# Patient Record
Sex: MALE | Race: WHITE | Employment: STUDENT | URBAN - METROPOLITAN AREA
[De-identification: names, ages, dates, MRNs, and addresses within clinical notes are randomized per-mention and may not be internally consistent; named-entity substitution may affect disease eponyms.]

---
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
## 2019-09-13 ENCOUNTER — HOSPITAL ENCOUNTER (OUTPATIENT)
Age: 19
Discharge: HOME | End: 2019-09-13
Payer: COMMERCIAL

## 2019-09-13 VITALS
TEMPERATURE: 98 F | HEART RATE: 61 BPM | SYSTOLIC BLOOD PRESSURE: 106 MMHG | DIASTOLIC BLOOD PRESSURE: 69 MMHG | OXYGEN SATURATION: 98 % | RESPIRATION RATE: 16 BRPM

## 2019-09-13 DIAGNOSIS — H61.22 IMPACTED CERUMEN OF LEFT EAR: Primary | ICD-10-CM

## 2019-09-13 PROCEDURE — 69209 REMOVE IMPACTED EAR WAX UNI: CPT

## 2019-09-13 PROCEDURE — 99212 OFFICE O/P EST SF 10 MIN: CPT

## 2019-09-13 NOTE — ED PROVIDER NOTES
Patient presents with:  Ear Problem Pain (neurosensory)      HPI:     Jean-Pierre Sanchez is a 20year old male who presents with a chief complaint of inability to hear out of the right ear for the past couple days. No pain. No history of trauma.   No heada Concerns:        Not on file    Social History Narrative      Not on file        ROS:   Positive for stated complaint: hearing loss right ear  All other systems reviewed and negative except as noted above. Constitutional and Vital Signs Reviewed.       Sharyn soon as possible for a visit in 2 days

## 2021-06-06 ENCOUNTER — HOSPITAL ENCOUNTER (OUTPATIENT)
Age: 21
Discharge: HOME | End: 2021-06-06
Attending: EMERGENCY MEDICINE
Payer: COMMERCIAL

## 2021-06-06 VITALS
HEART RATE: 87 BPM | OXYGEN SATURATION: 98 % | TEMPERATURE: 97 F | RESPIRATION RATE: 16 BRPM | DIASTOLIC BLOOD PRESSURE: 79 MMHG | SYSTOLIC BLOOD PRESSURE: 143 MMHG

## 2021-06-06 DIAGNOSIS — H61.21 IMPACTED CERUMEN OF RIGHT EAR: Primary | ICD-10-CM

## 2021-06-06 PROCEDURE — 99212 OFFICE O/P EST SF 10 MIN: CPT

## 2021-06-06 PROCEDURE — 69209 REMOVE IMPACTED EAR WAX UNI: CPT

## 2021-06-06 NOTE — ED INITIAL ASSESSMENT (HPI)
Patient reports muffled hearing from his right ear starting this am.  [place].

## 2021-06-06 NOTE — ED PROVIDER NOTES
Patient Seen in: [facility]      History   Patient presents with:  Ear Problem Pain    Stated Complaint: ear pain    HPI/Subjective:   HPI    The patient is a 25-year-old male with no significant past medical history who presents now with \"b Pulse ox is 98% on room air, normal.  Vital signs are stable    Procedure note: Ear irrigation  After irrigation by the medical assistant, the external auditory canals are now clear of wax.   The TMs are both normal.         MDM      Cerumen impaction gabo

## 2021-09-26 ENCOUNTER — APPOINTMENT (OUTPATIENT)
Dept: CT IMAGING | Age: 21
End: 2021-09-26
Attending: PHYSICIAN ASSISTANT
Payer: COMMERCIAL

## 2021-09-26 ENCOUNTER — HOSPITAL ENCOUNTER (OUTPATIENT)
Age: 21
Discharge: HOME | End: 2021-09-26
Attending: PHYSICIAN ASSISTANT
Payer: COMMERCIAL

## 2021-09-26 VITALS
SYSTOLIC BLOOD PRESSURE: 127 MMHG | TEMPERATURE: 98 F | RESPIRATION RATE: 18 BRPM | OXYGEN SATURATION: 96 % | HEART RATE: 93 BPM | DIASTOLIC BLOOD PRESSURE: 72 MMHG

## 2021-09-26 DIAGNOSIS — T07.XXXA MULTIPLE ABRASIONS: ICD-10-CM

## 2021-09-26 DIAGNOSIS — S02.2XXA CLOSED FRACTURE OF NASAL BONE, INITIAL ENCOUNTER: Primary | ICD-10-CM

## 2021-09-26 DIAGNOSIS — S09.90XA CLOSED HEAD INJURY WITHOUT LOSS OF CONSCIOUSNESS, INITIAL ENCOUNTER: ICD-10-CM

## 2021-09-26 PROCEDURE — 70486 CT MAXILLOFACIAL W/O DYE: CPT | Performed by: PHYSICIAN ASSISTANT

## 2021-09-26 PROCEDURE — 70450 CT HEAD/BRAIN W/O DYE: CPT | Performed by: PHYSICIAN ASSISTANT

## 2021-09-26 PROCEDURE — 21310 HC CLOSED TX NASAL BONE FX WITHOUT MANIPULATION: CPT

## 2021-09-26 PROCEDURE — 99214 OFFICE O/P EST MOD 30 MIN: CPT

## 2021-09-26 PROCEDURE — 21310 PR CLOSED TX NASAL BONE FX WITHOUT MANIPULATION: CPT

## 2021-09-26 NOTE — ED PROVIDER NOTES
Patient Seen in: [facility]    History   Patient presents with:  Trauma    Stated Complaint: NOSE INJURY    HPI    29-year-old male presents with chief complaint of nasal bridge pain. Onset yesterday.   Patient states that while intoxicated h mood, affect. Head: Positive tenderness to palpation present at nasal bridge. Positive superficial abrasion of outer right right lip and inner lip. Head is otherwise normocephalic/atraumatic. No crepitus. No Elias sign, hemotympanum, raccoon sign.   E extremities x4. Patient exhibits normal speech. Normal gait. No limb ataxia. Skin: Skin is normal to inspection and palpation, except as documented. Warm and dry. No obvious bruising. No obvious rash.            ED Course   Labs Reviewed - No data to dis [address] [facility] [id number] [phone]    Schedule an appointment as soon as possible for a visit in 1 week  For follow-up      Medications Prescribed:  There are no discharge medications for this patient.

## 2021-09-26 NOTE — ED INITIAL ASSESSMENT (HPI)
Patient was intoxicated yesterday and believes he fell down some stairs. Does not recall much about the fall, states he is unsure if he lost consciousness.   + pain and swelling to nasal bridge also reports pain to the back of his head.  + bilateral wrist

## (undated) NOTE — ED AVS SNAPSHOT
[facility] AND [facility] in [address].  [address]    Phone:  [phone]    Fax:  [address]   MRN: [id number]    Department:  [facility] in [address] [place] [address]   Date of Visit: Insurance plans vary and the physician(s) referred by the Immediate Care may not be covered by your plan. It is possible that the physician may not participate in your health insurance plan.   This may result in a lower benefit level being available to yo CARE PHYSICIAN AT ONCE OR GO TO THE EMERGENCY DEPARTMENT. If you have been prescribed any medication(s), please fill your prescription right away and begin taking the medication(s) as directed.   If you believe that any of the medications or instructions - If you have concerns related to behavioral health issues or thoughts of harming yourself, contact [address] [place] [address] at [phone].     - If you don’t have insurance, [facility] Lowery has partnered with [facility]